# Patient Record
(demographics unavailable — no encounter records)

---

## 2019-02-11 PROBLEM — Z78.9 EXERCISE TOLERANCE FINDING: Status: ACTIVE | Noted: 2017-04-01

## 2021-05-24 RX ORDER — DICLOFENAC SODIUM 75 MG/1
75 TABLET, DELAYED RELEASE ORAL
Qty: 180 TABLET | Refills: 0 | OUTPATIENT
Start: 2021-05-24

## 2021-05-24 NOTE — TELEPHONE ENCOUNTER
Requested Prescriptions     Pending Prescriptions Disp Refills    diclofenac EC (VOLTAREN) 75 mg EC tablet 180 Tablet 0     Sig: Take 1 Tablet by mouth two (2) times daily as needed for Pain (Take with food).

## 2021-06-21 RX ORDER — PROPRANOLOL HYDROCHLORIDE 60 MG/1
60 CAPSULE, EXTENDED RELEASE ORAL DAILY
Qty: 30 CAPSULE | Refills: 0 | OUTPATIENT
Start: 2021-06-21

## 2021-06-21 RX ORDER — TRAMADOL HYDROCHLORIDE 50 MG/1
25-50 TABLET ORAL
Qty: 120 TABLET | Refills: 0 | OUTPATIENT
Start: 2021-06-21 | End: 2021-07-21

## 2021-06-21 NOTE — TELEPHONE ENCOUNTER
Requested Prescriptions     Pending Prescriptions Disp Refills    traMADoL (ULTRAM) 50 mg tablet 120 Tablet 0     Sig: Take 0.5-1 Tablets by mouth every six (6) hours as needed for Pain for up to 30 days. Max Daily Amount: 200 mg.  propranolol LA (INDERAL LA) 60 mg SR capsule 30 Capsule 0     Sig: Take 1 Capsule by mouth daily.

## 2022-10-28 PROBLEM — Z98.890 S/P DEBRIDEMENT: Status: ACTIVE | Noted: 2022-10-28
